# Patient Record
Sex: FEMALE | URBAN - METROPOLITAN AREA
[De-identification: names, ages, dates, MRNs, and addresses within clinical notes are randomized per-mention and may not be internally consistent; named-entity substitution may affect disease eponyms.]

---

## 2021-07-15 ENCOUNTER — APPOINTMENT (RX ONLY)
Dept: URBAN - METROPOLITAN AREA CLINIC 105 | Facility: CLINIC | Age: 31
Setting detail: DERMATOLOGY
End: 2021-07-15

## 2021-07-15 DIAGNOSIS — T88.7XX: ICD-10-CM

## 2021-07-15 PROBLEM — L30.9 DERMATITIS, UNSPECIFIED: Status: ACTIVE | Noted: 2021-07-15

## 2021-07-15 PROCEDURE — 99203 OFFICE O/P NEW LOW 30 MIN: CPT

## 2021-07-15 PROCEDURE — ? COUNSELING

## 2021-07-15 PROCEDURE — ? DEFER

## 2021-07-15 NOTE — HPI: RASH
How Severe Is Your Rash?: moderate
Is This A New Presentation, Or A Follow-Up?: Rash
Additional History: Previously treated an unknown oral antibiotic and a topical cream prescribed by her PCP, with no improvement. Patient states she started a gluten free diet 1 month ago and has seen some improvement. Patient also states she’s having a biopsy next week to test for possible Celiac disease.

## 2021-07-15 NOTE — PROCEDURE: DEFER
Procedure To Be Performed At Next Visit: Biopsy by punch method
Introduction Text (Please End With A Colon): The following procedure was deferred:
Instructions (Optional): No new lesions are present today for a biopsy. Instructed patient to call office for a biopsy when she flares; will plan to biopsy a new lesion at that time no appointment needed (procedure only visit) okay to work patient in. If patient is unable to schedule a same day appointment, instructed patient to walk-in to office.
Detail Level: Detailed